# Patient Record
Sex: FEMALE | Race: WHITE | URBAN - METROPOLITAN AREA
[De-identification: names, ages, dates, MRNs, and addresses within clinical notes are randomized per-mention and may not be internally consistent; named-entity substitution may affect disease eponyms.]

---

## 2017-11-20 LAB — PAP SMEAR, EXTERNAL: NORMAL

## 2021-01-31 ENCOUNTER — HOSPITAL ENCOUNTER (OUTPATIENT)
Dept: LAB | Age: 51
Discharge: HOME | End: 2021-01-31

## 2021-01-31 LAB
ALBUMIN SERPL-MCNC: 2.8 G/DL (ref 3.5–5)
ALBUMIN/GLOB SERPL: 0.8 {RATIO} (ref 1.1–2.2)
ALP SERPL-CCNC: 100 U/L (ref 45–117)
ALT SERPL-CCNC: 28 U/L (ref 12–78)
ANION GAP SERPL CALC-SCNC: 7 MMOL/L (ref 5–15)
AST SERPL W P-5'-P-CCNC: 20 U/L (ref 15–37)
BASOPHILS # BLD: 0 K/UL (ref 0–0.1)
BASOPHILS NFR BLD: 0 % (ref 0–1)
BILIRUB SERPL-MCNC: 0.7 MG/DL (ref 0.2–1)
BUN SERPL-MCNC: 15 MG/DL (ref 6–20)
BUN/CREAT SERPL: 26 (ref 12–20)
CA-I BLD-MCNC: 8.8 MG/DL (ref 8.5–10.1)
CHLORIDE SERPL-SCNC: 105 MMOL/L (ref 97–108)
CO2 SERPL-SCNC: 27 MMOL/L (ref 21–32)
CREAT SERPL-MCNC: 0.58 MG/DL (ref 0.55–1.02)
DIFFERENTIAL METHOD BLD: ABNORMAL
EOSINOPHIL # BLD: 0.5 K/UL (ref 0–0.4)
EOSINOPHIL NFR BLD: 4 % (ref 0–7)
ERYTHROCYTE [DISTWIDTH] IN BLOOD BY AUTOMATED COUNT: 13.6 % (ref 11.5–14.5)
GLOBULIN SER CALC-MCNC: 3.5 G/DL (ref 2–4)
GLUCOSE SERPL-MCNC: 207 MG/DL (ref 65–100)
HCT VFR BLD AUTO: 43.9 % (ref 35–47)
HGB BLD-MCNC: 13.8 G/DL (ref 11.5–16)
IMM GRANULOCYTES # BLD AUTO: 0 K/UL (ref 0–0.04)
IMM GRANULOCYTES NFR BLD AUTO: 0 % (ref 0–0.5)
LYMPHOCYTES # BLD: 3.3 K/UL (ref 0.8–3.5)
LYMPHOCYTES NFR BLD: 27 % (ref 12–49)
MCH RBC QN AUTO: 28.7 PG (ref 26–34)
MCHC RBC AUTO-ENTMCNC: 31.4 G/DL (ref 30–36.5)
MCV RBC AUTO: 91.3 FL (ref 80–99)
MONOCYTES # BLD: 0.7 K/UL (ref 0–1)
MONOCYTES NFR BLD: 6 % (ref 5–13)
NEUTS SEG # BLD: 7.7 K/UL (ref 1.8–8)
NEUTS SEG NFR BLD: 63 % (ref 32–75)
PLATELET # BLD AUTO: 264 K/UL (ref 150–400)
PMV BLD AUTO: 11.5 FL (ref 8.9–12.9)
POTASSIUM SERPL-SCNC: 4.1 MMOL/L (ref 3.5–5.1)
PROT SERPL-MCNC: 6.3 G/DL (ref 6.4–8.2)
RBC # BLD AUTO: 4.81 M/UL (ref 3.8–5.2)
SODIUM SERPL-SCNC: 139 MMOL/L (ref 136–145)
WBC # BLD AUTO: 12.3 K/UL (ref 3.6–11)

## 2021-01-31 PROCEDURE — 84134 ASSAY OF PREALBUMIN: CPT

## 2021-01-31 PROCEDURE — 36415 COLL VENOUS BLD VENIPUNCTURE: CPT

## 2021-01-31 PROCEDURE — 80053 COMPREHEN METABOLIC PANEL: CPT

## 2021-01-31 PROCEDURE — 85025 COMPLETE CBC W/AUTO DIFF WBC: CPT

## 2021-01-31 PROCEDURE — 83036 HEMOGLOBIN GLYCOSYLATED A1C: CPT

## 2021-02-01 LAB
EST. AVERAGE GLUCOSE BLD GHB EST-MCNC: 235 MG/DL
HBA1C MFR BLD: 9.8 % (ref 4–5.6)
PREALB SERPL-MCNC: 17.3 MG/DL (ref 20–40)

## 2021-02-02 ENCOUNTER — HOSPITAL ENCOUNTER (OUTPATIENT)
Dept: LAB | Age: 51
Discharge: HOME | End: 2021-02-02

## 2021-02-02 LAB
BASOPHILS # BLD: 0 K/UL (ref 0–0.1)
BASOPHILS NFR BLD: 0 % (ref 0–1)
DIFFERENTIAL METHOD BLD: ABNORMAL
EOSINOPHIL # BLD: 0.3 K/UL (ref 0–0.4)
EOSINOPHIL NFR BLD: 3 % (ref 0–7)
ERYTHROCYTE [DISTWIDTH] IN BLOOD BY AUTOMATED COUNT: 13.2 % (ref 11.5–14.5)
HCT VFR BLD AUTO: 46.4 % (ref 35–47)
HGB BLD-MCNC: 14.8 G/DL (ref 11.5–16)
IMM GRANULOCYTES # BLD AUTO: 0 K/UL (ref 0–0.04)
IMM GRANULOCYTES NFR BLD AUTO: 0 % (ref 0–0.5)
LYMPHOCYTES # BLD: 3.6 K/UL (ref 0.8–3.5)
LYMPHOCYTES NFR BLD: 31 % (ref 12–49)
MCH RBC QN AUTO: 29.1 PG (ref 26–34)
MCHC RBC AUTO-ENTMCNC: 31.9 G/DL (ref 30–36.5)
MCV RBC AUTO: 91.2 FL (ref 80–99)
MONOCYTES # BLD: 0.7 K/UL (ref 0–1)
MONOCYTES NFR BLD: 6 % (ref 5–13)
NEUTS SEG # BLD: 7 K/UL (ref 1.8–8)
NEUTS SEG NFR BLD: 60 % (ref 32–75)
PLATELET # BLD AUTO: 270 K/UL (ref 150–400)
PMV BLD AUTO: 11.7 FL (ref 8.9–12.9)
RBC # BLD AUTO: 5.09 M/UL (ref 3.8–5.2)
WBC # BLD AUTO: 11.6 K/UL (ref 3.6–11)

## 2021-02-02 PROCEDURE — 36415 COLL VENOUS BLD VENIPUNCTURE: CPT

## 2021-02-02 PROCEDURE — 85025 COMPLETE CBC W/AUTO DIFF WBC: CPT

## 2021-06-01 LAB — MAMMOGRAPHY, EXTERNAL: NORMAL

## 2022-11-16 LAB
CREATININE, EXTERNAL: 0.62
HBA1C MFR BLD HPLC: 6.5 %
LDL-C, EXTERNAL: 49
TOTAL CHOLESTEROL, NCHOLT: 117

## 2022-11-17 VITALS — BODY MASS INDEX: 44.73 KG/M2 | HEIGHT: 64 IN | WEIGHT: 262 LBS

## 2022-11-17 RX ORDER — LOSARTAN POTASSIUM 50 MG/1
50 TABLET ORAL 2 TIMES DAILY
COMMUNITY
Start: 2022-10-02

## 2022-11-17 RX ORDER — LINACLOTIDE 145 UG/1
145 CAPSULE, GELATIN COATED ORAL DAILY
COMMUNITY
Start: 2022-09-24

## 2022-11-17 RX ORDER — FLASH GLUCOSE SENSOR
KIT MISCELLANEOUS
COMMUNITY
Start: 2022-09-18

## 2022-11-17 RX ORDER — METOPROLOL TARTRATE 50 MG/1
50 TABLET ORAL 2 TIMES DAILY
COMMUNITY
Start: 2022-08-19

## 2022-11-17 RX ORDER — METFORMIN HYDROCHLORIDE 500 MG/1
1000 TABLET ORAL 2 TIMES DAILY
COMMUNITY
Start: 2022-10-02

## 2022-11-17 RX ORDER — ATORVASTATIN CALCIUM 80 MG/1
80 TABLET, FILM COATED ORAL DAILY
COMMUNITY
Start: 2022-10-17

## 2022-11-17 RX ORDER — AMLODIPINE BESYLATE 2.5 MG/1
2.5 TABLET ORAL DAILY
COMMUNITY
Start: 2022-08-19 | End: 2022-11-29

## 2022-11-29 RX ORDER — AMLODIPINE BESYLATE 2.5 MG/1
TABLET ORAL
Qty: 90 TABLET | Refills: 0 | Status: SHIPPED | OUTPATIENT
Start: 2022-11-29

## 2022-11-30 PROBLEM — R79.89 ABNORMAL LFTS: Status: ACTIVE | Noted: 2022-11-30

## 2022-11-30 PROBLEM — E78.00 HYPERCHOLESTEROLEMIA: Status: ACTIVE | Noted: 2022-11-30

## 2022-11-30 PROBLEM — D72.829 LEUKOCYTOSIS: Status: ACTIVE | Noted: 2022-11-30

## 2022-11-30 PROBLEM — I63.9 STROKE (HCC): Status: ACTIVE | Noted: 2022-11-30

## 2022-11-30 PROBLEM — E66.9 OBESITY (BMI 30-39.9): Status: ACTIVE | Noted: 2022-11-30

## 2022-11-30 PROBLEM — I10 HYPERTENSION: Status: ACTIVE | Noted: 2022-11-30

## 2022-12-09 ENCOUNTER — OFFICE VISIT (OUTPATIENT)
Dept: INTERNAL MEDICINE CLINIC | Age: 52
End: 2022-12-09
Payer: COMMERCIAL

## 2022-12-09 VITALS
TEMPERATURE: 97.9 F | BODY MASS INDEX: 45.24 KG/M2 | HEIGHT: 64 IN | HEART RATE: 60 BPM | WEIGHT: 265 LBS | DIASTOLIC BLOOD PRESSURE: 80 MMHG | SYSTOLIC BLOOD PRESSURE: 130 MMHG

## 2022-12-09 DIAGNOSIS — E78.00 HYPERCHOLESTEROLEMIA: ICD-10-CM

## 2022-12-09 DIAGNOSIS — I10 PRIMARY HYPERTENSION: Primary | ICD-10-CM

## 2022-12-09 DIAGNOSIS — E66.01 MORBID OBESITY WITH BMI OF 45.0-49.9, ADULT (HCC): ICD-10-CM

## 2022-12-09 DIAGNOSIS — E11.69 TYPE 2 DIABETES MELLITUS WITH HYPERCHOLESTEROLEMIA (HCC): ICD-10-CM

## 2022-12-09 DIAGNOSIS — E83.42 HYPOMAGNESEMIA: ICD-10-CM

## 2022-12-09 DIAGNOSIS — E78.00 TYPE 2 DIABETES MELLITUS WITH HYPERCHOLESTEROLEMIA (HCC): ICD-10-CM

## 2022-12-09 PROBLEM — R53.83 FATIGUE: Status: ACTIVE | Noted: 2021-03-30

## 2022-12-09 PROBLEM — E11.9 DIABETES MELLITUS (HCC): Status: ACTIVE | Noted: 2021-01-27

## 2022-12-09 RX ORDER — LANOLIN ALCOHOL/MO/W.PET/CERES
400 CREAM (GRAM) TOPICAL DAILY
Qty: 90 TABLET | Refills: 1 | Status: SHIPPED | OUTPATIENT
Start: 2022-12-09

## 2022-12-09 RX ORDER — METFORMIN HYDROCHLORIDE 500 MG/1
1000 TABLET ORAL 2 TIMES DAILY
Qty: 360 TABLET | Refills: 1 | Status: SHIPPED | OUTPATIENT
Start: 2022-12-09

## 2022-12-09 RX ORDER — LANOLIN ALCOHOL/MO/W.PET/CERES
400 CREAM (GRAM) TOPICAL DAILY
COMMUNITY
Start: 2022-10-02 | End: 2022-12-09 | Stop reason: SDUPTHER

## 2022-12-09 RX ORDER — LOSARTAN POTASSIUM 50 MG/1
50 TABLET ORAL 2 TIMES DAILY
Qty: 90 TABLET | Refills: 1 | Status: SHIPPED | OUTPATIENT
Start: 2022-12-09

## 2022-12-09 NOTE — PROGRESS NOTES
[address] [facility] Internal Medicine  [address]  [place], [address] [facility]  Phone: [phone]      Patel Allen (: 1970) is a 46 y.o. female, established patient, here for evaluation of the following chief complaint(s):  Labs and Follow Up Chronic Condition         SUBJECTIVE/OBJECTIVE:  HPI:  Patient is here for follow up, she recently had blood work. Her glucose was 171 this morning, she denies any episodes of low blood sugar. She needs to  her [facility]. She had her annual eye exam for , she did not receive a prescription for new glasses. She needs a refill on Losartan, Metformin, and Magnesium. She has not had COVID or annual Flu vaccination. Patient stated she did not do mammogram this year and is planning to do in 2022. Prior to Admission medications    Medication Sig Start Date End Date Taking? Authorizing Provider   magnesium oxide (MAG-OX) 400 mg tablet Take 1 Tablet by mouth daily. 22  Yes Alexandrea Pope MD   losartan (COZAAR) 50 mg tablet Take 1 Tablet by mouth two (2) times a day. 22  Yes Alexandrea Pope MD   metFORMIN (GLUCOPHAGE) 500 mg tablet Take 2 Tablets by mouth two (2) times a day. 22  Yes Alexandrea Pope MD   amLODIPine (NORVASC) 2.5 mg tablet TAKE 1 TABLET BY MOUTH DAILY 22  Yes Alexandrea Pope MD   atorvastatin (LIPITOR) 80 mg tablet Take 80 mg by mouth daily. 10/17/22  Yes Provider, Historical   metoprolol tartrate (LOPRESSOR) 50 mg tablet Take 50 mg by mouth two (2) times a day. 22  Yes Provider, Historical   Linzess 145 mcg cap capsule Take 145 mcg by mouth daily. 22  Yes Provider, Historical   FreeStyle Aman 14 Day Sensor kit USE AS DIRECTED. EVERY 14 DAYS 22  Yes Provider, Historical        Allergies   Allergen Reactions    Penicillin G Hives     Reaction Type:  Allergy    Penicillins Rash        Past Medical History:   Diagnosis Date    Abnormal LFTs     Hypercholesterolemia Hypertension     Leukocytosis     Obesity (BMI 30-39. 9)     Stroke [facility])         Family History   Problem Relation Age of Onset    Stroke Mother     Cancer Mother 35        uterine    Hypertension Father     Cancer Father         skin    Breast Cancer Sister     Diabetes Paternal Grandmother         Past Surgical History:   Procedure Laterality Date    HX  SECTION   &        Review of Systems   Constitutional:  Negative for chills and fever. HENT:  Negative for congestion, ear pain, nosebleeds, sinus pain, sore throat and tinnitus. Eyes:  Negative for redness. Respiratory:  Negative for cough and shortness of breath. Cardiovascular:  Negative for chest pain and palpitations. Gastrointestinal:  Negative for abdominal pain, diarrhea, nausea and vomiting. Endocrine: Negative for cold intolerance and polyuria. Genitourinary:  Negative for dysuria and hematuria. Musculoskeletal:  Negative for back pain and neck pain. Skin:  Negative for rash. Neurological:  Negative for dizziness and headaches. Psychiatric/Behavioral: Negative. /80   Pulse 60   Temp 97.9 °F (36.6 °C) (Temporal)   Ht 5' 4\" (1.626 m)   Wt 265 lb (120.2 kg)   BMI 45.49 kg/m²      Physical Exam  Constitutional:       General: She is not in acute distress. Appearance: Normal appearance. HENT:      Head: Normocephalic and atraumatic. Right Ear: External ear normal.      Left Ear: External ear normal.      Nose: Nose normal.      Mouth/Throat:      Mouth: Mucous membranes are moist.   Eyes:      Extraocular Movements: Extraocular movements intact. Pupils: Pupils are equal, round, and reactive to light. Neck:      Comments: Neck is short  Cardiovascular:      Rate and Rhythm: Normal rate and regular rhythm. Pulmonary:      Effort: Pulmonary effort is normal.      Breath sounds: Normal breath sounds. Abdominal:      General: Bowel sounds are normal.      Palpations: Abdomen is soft. Tenderness: There is no abdominal tenderness. Musculoskeletal:         General: Normal range of motion. Cervical back: Neck supple. Skin:     General: Skin is warm and dry. Comments: Skin tags neck   Neurological:      General: No focal deficit present. Mental Status: She is alert and oriented to person, place, and time. Psychiatric:         Mood and Affect: Mood normal.       ASSESSMENT/PLAN:  Below is the assessment and plan developed based on review of pertinent history, physical exam, labs, studies, and medications. 1. Primary hypertension  Comments:  Blood pressure is controlled, advised to continue low-sodium diet and current medications  Orders:  -     losartan (COZAAR) 50 mg tablet; Take 1 Tablet by mouth two (2) times a day., Normal, Disp-90 Tablet, R-1  2. Hypercholesterolemia  Comments:  Cholesterol is well controlled with  total cholesterol of 117, triglyceride 95, HDL 50, LDL 49. Advised to continue low-fat diet and atorvastatin 6 days a wee  Orders:  -     LIPID PANEL; Future  -     TSH 3RD GENERATION; Future  3. Morbid obesity with BMI of 45.0-49.9, adult ([facility])  Comments:  Advised healthy diet, to avoid soft drinks including diet soft drinks juice and juices, daily exercise and weight loss. .  Advised to check with insurance whether Kirkwood Bejarano will be covered  4. Type 2 diabetes mellitus with hypercholesterolemia ([facility])  Comments:  Diabetes is controlled with a glucose of 116 and A1c 6.5 BUN 19, creatinine 0.62. Advised to continue diabetic diet and metformin  Orders:  -     HEMOGLOBIN A1C WITH EAG; Future  -     MICROALBUMIN, UR, RAND W/ MICROALB/CREAT RATIO; Future  -     METABOLIC PANEL, COMPREHENSIVE; Future  -     metFORMIN (GLUCOPHAGE) 500 mg tablet; Take 2 Tablets by mouth two (2) times a day., Normal, Disp-360 Tablet, R-1  5. Hypomagnesemia  Comments:   To continue magnesium, will check magnesium level  Orders:  -     magnesium oxide (MAG-OX) 400 mg tablet; Take 1 Tablet by mouth daily. , Normal, Disp-90 Tablet, R-1  -     MAGNESIUM; Future    Return if symptoms worsen or fail to improve. There are no Patient Instructions on file for this visit. Health Maintenance Due   Topic Date Due    Hepatitis C Screening  Never done    Depression Screen  Never done    COVID-19 Vaccine (1) Never done    Foot Exam Q1  Never done    MICROALBUMIN Q1  Never done    Eye Exam Retinal or Dilated  Never done    DTaP/Tdap/Td series (1 - Tdap) Never done    Shingrix Vaccine Age 50> (1 of 2) Never done    Cervical cancer screen  11/20/2020    Breast Cancer Screen Mammogram  06/01/2022    Flu Vaccine (1) Never done        Aspects of this note may have been generated using voice recognition software. Despite editing, there may be unrecognized errors. An electronic signature was used to authenticate this note.   -- Kandice Caraballo MD

## 2022-12-09 NOTE — PROGRESS NOTES
Chief Complaint   Patient presents with    Labs    Follow Up Chronic Condition     1. Have you been to the ER, urgent care clinic since your last visit? Hospitalized since your last visit? No    2. Have you seen or consulted any other health care providers outside of the [address] [place] [address] since your last visit? Include any pap smears or colon screening.   Colonoscopy Dr. Talia Contreras Imiquimod Counseling:  I discussed with the patient the risks of imiquimod including but not limited to erythema, scaling, itching, weeping, crusting, and pain.  Patient understands that the inflammatory response to imiquimod is variable from person to person and was educated regarded proper titration schedule.  If flu-like symptoms develop, patient knows to discontinue the medication and contact us.

## 2022-12-30 DIAGNOSIS — I10 PRIMARY HYPERTENSION: ICD-10-CM

## 2022-12-30 RX ORDER — LOSARTAN POTASSIUM 50 MG/1
TABLET ORAL
Qty: 90 TABLET | Refills: 1 | Status: SHIPPED | OUTPATIENT
Start: 2022-12-30

## 2023-01-04 RX ORDER — BLOOD SUGAR DIAGNOSTIC
STRIP MISCELLANEOUS
Qty: 200 STRIP | Refills: 1 | Status: SHIPPED | OUTPATIENT
Start: 2023-01-04

## 2023-01-12 ENCOUNTER — TELEPHONE (OUTPATIENT)
Dept: INTERNAL MEDICINE CLINIC | Age: 53
End: 2023-01-12

## 2023-01-12 NOTE — TELEPHONE ENCOUNTER
Patient states insurance will cover Natividad Mayer.   [facility]

## 2023-01-17 RX ORDER — TIRZEPATIDE 2.5 MG/.5ML
2.5 INJECTION, SOLUTION SUBCUTANEOUS
Qty: 4 PEN | Refills: 0 | Status: SHIPPED | OUTPATIENT
Start: 2023-01-17 | End: 2023-01-21 | Stop reason: ALTCHOICE

## 2023-02-28 RX ORDER — FLASH GLUCOSE SENSOR
KIT MISCELLANEOUS
Qty: 1 KIT | Refills: 4 | Status: SHIPPED | OUTPATIENT
Start: 2023-02-28

## 2023-03-06 RX ORDER — AMLODIPINE BESYLATE 2.5 MG/1
2.5 TABLET ORAL DAILY
Qty: 90 TABLET | Refills: 1 | Status: SHIPPED | OUTPATIENT
Start: 2023-03-06

## 2023-03-06 NOTE — TELEPHONE ENCOUNTER
Requested Prescriptions     Pending Prescriptions Disp Refills    amLODIPine (NORVASC) 2.5 mg tablet 90 Tablet 0     Sig: Take 1 Tablet by mouth daily.

## 2023-04-29 RX ORDER — ATORVASTATIN CALCIUM 80 MG/1
TABLET, FILM COATED ORAL
Qty: 90 TABLET | Refills: 0 | Status: SHIPPED | OUTPATIENT
Start: 2023-04-29

## 2023-05-16 RX ORDER — ATORVASTATIN CALCIUM 80 MG/1
1 TABLET, FILM COATED ORAL DAILY
COMMUNITY
Start: 2023-04-29 | End: 2023-06-15 | Stop reason: SDUPTHER

## 2023-05-16 RX ORDER — METOPROLOL TARTRATE 50 MG/1
50 TABLET, FILM COATED ORAL 2 TIMES DAILY
COMMUNITY
Start: 2022-08-19 | End: 2023-06-15 | Stop reason: SDUPTHER

## 2023-05-16 RX ORDER — LOSARTAN POTASSIUM 50 MG/1
1 TABLET ORAL 2 TIMES DAILY
COMMUNITY
Start: 2022-12-30 | End: 2023-06-15 | Stop reason: SDUPTHER

## 2023-05-16 RX ORDER — AMLODIPINE BESYLATE 2.5 MG/1
2.5 TABLET ORAL DAILY
COMMUNITY
Start: 2023-03-06 | End: 2023-06-15 | Stop reason: SDUPTHER

## 2023-05-16 RX ORDER — MAGNESIUM OXIDE 400 MG/1
400 TABLET ORAL DAILY
COMMUNITY
Start: 2022-12-09

## 2023-05-16 RX ORDER — SEMAGLUTIDE 1.34 MG/ML
INJECTION, SOLUTION SUBCUTANEOUS
COMMUNITY
Start: 2023-04-09 | End: 2023-06-15 | Stop reason: SDUPTHER

## 2023-05-25 LAB
ALBUMIN SERPL-MCNC: 3.9 G/DL (ref 3.8–4.9)
ALBUMIN/CREAT UR: <11 MG/G CREAT (ref 0–29)
ALBUMIN/GLOB SERPL: 1.4 {RATIO} (ref 1.2–2.2)
ALP SERPL-CCNC: 103 IU/L (ref 44–121)
ALT SERPL-CCNC: 26 IU/L (ref 0–32)
AST SERPL-CCNC: 17 IU/L (ref 0–40)
BILIRUB SERPL-MCNC: 0.4 MG/DL (ref 0–1.2)
BUN SERPL-MCNC: 11 MG/DL (ref 6–24)
BUN/CREAT SERPL: 16 (ref 9–23)
CALCIUM SERPL-MCNC: 9.7 MG/DL (ref 8.7–10.2)
CHLORIDE SERPL-SCNC: 101 MMOL/L (ref 96–106)
CHOLEST SERPL-MCNC: 129 MG/DL (ref 100–199)
CO2 SERPL-SCNC: 25 MMOL/L (ref 20–29)
CREAT SERPL-MCNC: 0.67 MG/DL (ref 0.57–1)
CREAT UR-MCNC: 26.3 MG/DL
EGFRCR SERPLBLD CKD-EPI 2021: 105 ML/MIN/1.73
EST. AVERAGE GLUCOSE BLD GHB EST-MCNC: 137 MG/DL
GLOBULIN SER CALC-MCNC: 2.7 G/DL (ref 1.5–4.5)
GLUCOSE SERPL-MCNC: 117 MG/DL (ref 70–99)
HBA1C MFR BLD: 6.4 % (ref 4.8–5.6)
HDLC SERPL-MCNC: 55 MG/DL
LDLC SERPL CALC-MCNC: 56 MG/DL (ref 0–99)
MAGNESIUM SERPL-MCNC: 1.5 MG/DL (ref 1.6–2.3)
MICROALBUMIN UR-MCNC: <3 UG/ML
POTASSIUM SERPL-SCNC: 4.9 MMOL/L (ref 3.5–5.2)
PROT SERPL-MCNC: 6.6 G/DL (ref 6–8.5)
SODIUM SERPL-SCNC: 139 MMOL/L (ref 134–144)
TRIGL SERPL-MCNC: 95 MG/DL (ref 0–149)
TSH SERPL DL<=0.005 MIU/L-ACNC: 1.74 UIU/ML (ref 0.45–4.5)
VLDLC SERPL CALC-MCNC: 18 MG/DL (ref 5–40)

## 2023-06-15 DIAGNOSIS — E78.00 HYPERCHOLESTEROLEMIA: ICD-10-CM

## 2023-06-15 DIAGNOSIS — Z11.59 NEED FOR HEPATITIS C SCREENING TEST: ICD-10-CM

## 2023-06-15 DIAGNOSIS — E11.9 TYPE 2 DIABETES MELLITUS WITHOUT COMPLICATION, WITHOUT LONG-TERM CURRENT USE OF INSULIN (HCC): ICD-10-CM

## 2023-06-15 DIAGNOSIS — E83.42 HYPOMAGNESEMIA: ICD-10-CM

## 2023-07-03 RX ORDER — FLASH GLUCOSE SENSOR
KIT MISCELLANEOUS
Qty: 1 EACH | Refills: 1 | Status: SHIPPED | OUTPATIENT
Start: 2023-07-03

## 2023-09-06 ENCOUNTER — TELEPHONE (OUTPATIENT)
Facility: CLINIC | Age: 53
End: 2023-09-06

## 2023-09-06 RX ORDER — AMLODIPINE BESYLATE 5 MG/1
5 TABLET ORAL DAILY
Qty: 90 TABLET | Refills: 1 | Status: SHIPPED | OUTPATIENT
Start: 2023-09-06

## 2023-09-06 NOTE — TELEPHONE ENCOUNTER
Patient called. Her insurance will not cover Amlodipine 2.5 twice daily. They will only cover 5mg. Patient wants it sent in as 5mg so she doesn't have to pay out of pocket.  Patient stated she has been monitoring her blood pressures at home and there has been very few times where she only needed 2.5mg

## 2023-09-10 RX ORDER — FLASH GLUCOSE SENSOR
KIT MISCELLANEOUS
Qty: 1 EACH | Refills: 1 | Status: SHIPPED | OUTPATIENT
Start: 2023-09-10

## 2023-11-18 RX ORDER — LOSARTAN POTASSIUM 50 MG/1
50 TABLET ORAL 2 TIMES DAILY
Qty: 180 TABLET | Refills: 1 | Status: SHIPPED | OUTPATIENT
Start: 2023-11-18

## 2023-12-14 RX ORDER — SEMAGLUTIDE 0.68 MG/ML
INJECTION, SOLUTION SUBCUTANEOUS
Qty: 3 ML | Refills: 1 | Status: SHIPPED | OUTPATIENT
Start: 2023-12-14

## 2024-01-30 ENCOUNTER — OFFICE VISIT (OUTPATIENT)
Facility: CLINIC | Age: 54
End: 2024-01-30
Payer: COMMERCIAL

## 2024-01-30 VITALS
DIASTOLIC BLOOD PRESSURE: 88 MMHG | WEIGHT: 282 LBS | OXYGEN SATURATION: 99 % | TEMPERATURE: 97.4 F | HEART RATE: 63 BPM | SYSTOLIC BLOOD PRESSURE: 126 MMHG | BODY MASS INDEX: 48.14 KG/M2 | HEIGHT: 64 IN

## 2024-01-30 DIAGNOSIS — M25.471 ANKLE EDEMA, BILATERAL: ICD-10-CM

## 2024-01-30 DIAGNOSIS — I10 ESSENTIAL HYPERTENSION, BENIGN: Primary | ICD-10-CM

## 2024-01-30 DIAGNOSIS — E83.42 HYPOMAGNESEMIA: ICD-10-CM

## 2024-01-30 DIAGNOSIS — I10 ESSENTIAL HYPERTENSION, BENIGN: ICD-10-CM

## 2024-01-30 DIAGNOSIS — E11.9 TYPE 2 DIABETES MELLITUS WITHOUT COMPLICATION, WITHOUT LONG-TERM CURRENT USE OF INSULIN (HCC): ICD-10-CM

## 2024-01-30 DIAGNOSIS — D72.10 EOSINOPHILIA, UNSPECIFIED TYPE: ICD-10-CM

## 2024-01-30 DIAGNOSIS — Z12.31 BREAST CANCER SCREENING BY MAMMOGRAM: ICD-10-CM

## 2024-01-30 DIAGNOSIS — E78.00 HYPERCHOLESTEROLEMIA: ICD-10-CM

## 2024-01-30 DIAGNOSIS — M25.472 ANKLE EDEMA, BILATERAL: ICD-10-CM

## 2024-01-30 PROBLEM — I63.9: Status: ACTIVE | Noted: 2022-11-30

## 2024-01-30 PROCEDURE — 3074F SYST BP LT 130 MM HG: CPT | Performed by: INTERNAL MEDICINE

## 2024-01-30 PROCEDURE — 3079F DIAST BP 80-89 MM HG: CPT | Performed by: INTERNAL MEDICINE

## 2024-01-30 PROCEDURE — 99214 OFFICE O/P EST MOD 30 MIN: CPT | Performed by: INTERNAL MEDICINE

## 2024-01-30 RX ORDER — AMLODIPINE BESYLATE 5 MG/1
5 TABLET ORAL DAILY
Qty: 90 TABLET | Refills: 1 | Status: SHIPPED | OUTPATIENT
Start: 2024-01-30

## 2024-01-30 RX ORDER — METOPROLOL TARTRATE 50 MG/1
50 TABLET, FILM COATED ORAL 2 TIMES DAILY
Qty: 180 TABLET | Refills: 1 | Status: SHIPPED | OUTPATIENT
Start: 2024-01-30

## 2024-01-30 RX ORDER — ATORVASTATIN CALCIUM 80 MG/1
80 TABLET, FILM COATED ORAL DAILY
Qty: 90 TABLET | Refills: 1 | Status: SHIPPED | OUTPATIENT
Start: 2024-01-30

## 2024-01-30 ASSESSMENT — PATIENT HEALTH QUESTIONNAIRE - PHQ9
SUM OF ALL RESPONSES TO PHQ QUESTIONS 1-9: 0
SUM OF ALL RESPONSES TO PHQ9 QUESTIONS 1 & 2: 0
2. FEELING DOWN, DEPRESSED OR HOPELESS: 0
1. LITTLE INTEREST OR PLEASURE IN DOING THINGS: 0

## 2024-01-30 NOTE — PROGRESS NOTES
Chief Complaint   Patient presents with    Discuss Labs    Follow-up Chronic Condition         1. \"Have you been to the ER, urgent care clinic since your last visit?  Hospitalized since your last visit?\" No    2. \"Have you seen or consulted any other health care providers outside of the [facility] since your last visit?\" No     3. For patients aged 45-75: Has the patient had a colonoscopy / FIT/ Cologuard? Yes - Care Gap present. Most recent result on file      If the patient is female:    4. For patients aged 40-74: Has the patient had a mammogram within the past 2 years? Yes - Care Gap present. Most recent result on file      5. For patients aged 21-65: Has the patient had a pap smear? No

## 2024-01-30 NOTE — PROGRESS NOTES
[facility]  [address] [place], [place] [address]  Phone: [phone]      Poornima Ulloa (: 1970) is a 53 y.o. female, established patient, here for evaluation of the following chief complaint(s):    SUBJECTIVE/OBJECTIVE:  HPI:  Patient is here for follow up, she recently had blood work. She checks her blood sugar 1 time daily; some of her ranges are . She denies any episodes of low blood sugar and her annual eye exam is due in March. She has been unable to get her CGM and test strips. Her blood pressure at home runs around; 110//62. She has not been in the ER or the hospital since her last visit.   She has some hand pain in her joints and her left thumb locks up and she has to use her other hand to unlock it. Didn't  do mammogram in ,usually does with [facility]. Prefers  not to get vaccinations.Had eye exam in 3/2023 with Dr Don .  Patient stated she is taking care of her mom get time to do her mammogram.  Patient stated she is unable to get the Ozempic as it is out of stock.  She stated her  is on Jardiance and it is helping him to lose weight.  He is taking magnesium 3 tablets daily    Hemoglobin A1C   Date Value Ref Range Status   11/15/2023 6.7 (H) 4.8 - 5.6 % Final     Comment:              Prediabetes: 5.7 - 6.4           Diabetes: >6.4           Glycemic control for adults with diabetes: <7.0        Hemoglobin   Date Value Ref Range Status   11/15/2023 12.4 11.1 - 15.9 g/dL Final     Hematocrit   Date Value Ref Range Status   11/15/2023 38.9 34.0 - 46.6 % Final     Creatinine   Date Value Ref Range Status   11/15/2023 0.58 0.57 - 1.00 mg/dL Final     Glucose   Date Value Ref Range Status   11/15/2023 114 (H) 70 - 99 mg/dL Final     TSH   Date Value Ref Range Status   2023 1.740 0.450 - 4.500 uIU/mL Final     Potassium   Date Value Ref Range Status   11/15/2023 4.5 3.5 - 5.2 mmol/L Final     Cholesterol   Date Value Ref

## 2024-01-31 RX ORDER — FLASH GLUCOSE SENSOR
KIT MISCELLANEOUS
Qty: 1 EACH | Refills: 1 | Status: SHIPPED | OUTPATIENT
Start: 2024-01-31

## 2024-02-28 ENCOUNTER — OFFICE VISIT (OUTPATIENT)
Age: 54
End: 2024-02-28
Payer: COMMERCIAL

## 2024-02-28 VITALS — HEART RATE: 75 BPM | SYSTOLIC BLOOD PRESSURE: 134 MMHG | DIASTOLIC BLOOD PRESSURE: 82 MMHG | OXYGEN SATURATION: 97 %

## 2024-02-28 DIAGNOSIS — I63.9: Primary | ICD-10-CM

## 2024-02-28 DIAGNOSIS — H53.9 VISUAL CHANGES: ICD-10-CM

## 2024-02-28 DIAGNOSIS — E11.42 TYPE 2 DIABETES MELLITUS WITH DIABETIC POLYNEUROPATHY, WITHOUT LONG-TERM CURRENT USE OF INSULIN (HCC): ICD-10-CM

## 2024-02-28 PROCEDURE — 3075F SYST BP GE 130 - 139MM HG: CPT | Performed by: NURSE PRACTITIONER

## 2024-02-28 PROCEDURE — 99245 OFF/OP CONSLTJ NEW/EST HI 55: CPT | Performed by: NURSE PRACTITIONER

## 2024-02-28 PROCEDURE — 3079F DIAST BP 80-89 MM HG: CPT | Performed by: NURSE PRACTITIONER

## 2024-02-28 RX ORDER — ASPIRIN 81 MG/1
81 TABLET, CHEWABLE ORAL DAILY
COMMUNITY

## 2024-02-28 NOTE — PROGRESS NOTES
Poornima Ulloa is a 53 y.o. female who presents with the following  Chief Complaint   Patient presents with    New Patient     TIA, late Jan 2021       HPI    Patient comes in to establish as a new patient for stroke in 2021  She had a left MCA stroke  The imaging and information is all on epic via [facility]  She is working at her A1c  She does note that she needs to increase activity level and try to lose weight  She is on Ozempic  Her A1c is a little bit high  Her blood pressure is great  Her LDL is at goal with Lipitor below 70    She does notice that when she is writing and is more tired and has written more her actual penmanship gets worse  She also notices that sometimes she will have some expressive aphasia or carpal her speech when she is talking too fast or has been talking a lot  This does not happen but maybe once or twice a week  Her  does notice it but it is not really a big issue  She has been through physical therapy and Occupational Therapy and speech therapy in the past  No falls  No memory loss or confusion  She does get a good solid 6 hours of sleep at night  She does work and drives a lot in [facility]  She has not had any recent imaging  She is following with ophthalmology and was told there is a little bit of leaking blood noticeable back behind her eyes  [facility] which showed peripheral neuropathy  Mostly what sounds to be a meralgia paresthetica on the right thigh    Allergies   Allergen Reactions    Penicillins Hives and Rash     Reaction Type: Allergy         Current Outpatient Medications   Medication Sig Dispense Refill    aspirin 81 MG chewable tablet Take 1 tablet by mouth daily      Continuous Blood Gluc Sensor (SporterpilotSTYLE DIANNA 14 DAY SENSOR) MISC USE TO CHECK BLOOD SUGAR AS DIRECTED AND CHANGE EVERY 14 DAYS 1 each 1    amLODIPine (NORVASC) 5 MG tablet Take 1 tablet by mouth daily 90 tablet 1    metoprolol tartrate (LOPRESSOR) 50 MG tablet Take 1 tablet by

## 2024-02-28 NOTE — PROGRESS NOTES
Was seeing neuro since 2021, Dr. Guallpa  Has done OT/PT/Speech  Currently has been doing ok, no residual symptoms

## 2024-04-16 RX ORDER — FELODIPINE 5 MG/1
5 TABLET, EXTENDED RELEASE ORAL DAILY
COMMUNITY
End: 2024-04-16 | Stop reason: SDUPTHER

## 2024-04-16 NOTE — TELEPHONE ENCOUNTER
Rx for Amlodipine is not covered by patient's plan. The preferred alternative is: Felodipine. Please change.MAGDA TAYLOR MA

## 2024-04-17 RX ORDER — FELODIPINE 5 MG/1
5 TABLET, EXTENDED RELEASE ORAL DAILY
Qty: 30 TABLET | Refills: 1 | Status: SHIPPED | OUTPATIENT
Start: 2024-04-17

## 2024-04-18 RX ORDER — FELODIPINE 5 MG/1
5 TABLET, EXTENDED RELEASE ORAL DAILY
Qty: 90 TABLET | OUTPATIENT
Start: 2024-04-18

## 2024-05-18 DIAGNOSIS — I10 ESSENTIAL HYPERTENSION, BENIGN: ICD-10-CM

## 2024-05-18 DIAGNOSIS — E78.00 HYPERCHOLESTEROLEMIA: ICD-10-CM

## 2024-05-19 RX ORDER — METOPROLOL TARTRATE 50 MG/1
50 TABLET, FILM COATED ORAL 2 TIMES DAILY
Qty: 180 TABLET | Refills: 0 | Status: SHIPPED | OUTPATIENT
Start: 2024-05-19

## 2024-05-19 RX ORDER — ATORVASTATIN CALCIUM 80 MG/1
80 TABLET, FILM COATED ORAL DAILY
Qty: 90 TABLET | Refills: 0 | Status: SHIPPED | OUTPATIENT
Start: 2024-05-19

## 2024-05-19 RX ORDER — LOSARTAN POTASSIUM 50 MG/1
50 TABLET ORAL 2 TIMES DAILY
Qty: 180 TABLET | Refills: 0 | Status: SHIPPED | OUTPATIENT
Start: 2024-05-19

## 2024-06-12 ENCOUNTER — TELEPHONE (OUTPATIENT)
Facility: CLINIC | Age: 54
End: 2024-06-12

## 2024-06-12 NOTE — TELEPHONE ENCOUNTER
Contacted patient regarding scheduling of mammogram. Patient reports she has her mammogram done at [facility].

## 2024-06-16 RX ORDER — FELODIPINE 5 MG/1
5 TABLET, EXTENDED RELEASE ORAL DAILY
Qty: 30 TABLET | Refills: 1 | Status: SHIPPED | OUTPATIENT
Start: 2024-06-16

## 2024-06-17 RX ORDER — FELODIPINE 5 MG/1
5 TABLET, EXTENDED RELEASE ORAL DAILY
Qty: 90 TABLET | OUTPATIENT
Start: 2024-06-17

## 2024-06-17 RX ORDER — FLASH GLUCOSE SENSOR
KIT MISCELLANEOUS
Qty: 1 EACH | Refills: 1 | Status: SHIPPED | OUTPATIENT
Start: 2024-06-17

## 2024-07-07 RX ORDER — LINACLOTIDE 145 UG/1
145 CAPSULE, GELATIN COATED ORAL DAILY
Qty: 90 CAPSULE | Refills: 1 | Status: SHIPPED | OUTPATIENT
Start: 2024-07-07

## 2024-07-26 RX ORDER — EMPAGLIFLOZIN 10 MG/1
10 TABLET, FILM COATED ORAL DAILY
Qty: 90 TABLET | Refills: 1 | Status: SHIPPED | OUTPATIENT
Start: 2024-07-26

## 2024-08-17 RX ORDER — LOSARTAN POTASSIUM 50 MG/1
50 TABLET ORAL 2 TIMES DAILY
Qty: 180 TABLET | Refills: 0 | Status: SHIPPED | OUTPATIENT
Start: 2024-08-17

## 2024-08-21 ENCOUNTER — OFFICE VISIT (OUTPATIENT)
Age: 54
End: 2024-08-21
Payer: COMMERCIAL

## 2024-08-21 VITALS
RESPIRATION RATE: 16 BRPM | TEMPERATURE: 97.2 F | HEART RATE: 72 BPM | OXYGEN SATURATION: 99 % | DIASTOLIC BLOOD PRESSURE: 76 MMHG | SYSTOLIC BLOOD PRESSURE: 126 MMHG

## 2024-08-21 DIAGNOSIS — I63.9: Primary | ICD-10-CM

## 2024-08-21 DIAGNOSIS — E11.42 TYPE 2 DIABETES MELLITUS WITH DIABETIC POLYNEUROPATHY, WITHOUT LONG-TERM CURRENT USE OF INSULIN (HCC): ICD-10-CM

## 2024-08-21 PROCEDURE — 99214 OFFICE O/P EST MOD 30 MIN: CPT | Performed by: NURSE PRACTITIONER

## 2024-08-21 PROCEDURE — 3074F SYST BP LT 130 MM HG: CPT | Performed by: NURSE PRACTITIONER

## 2024-08-21 PROCEDURE — 3078F DIAST BP <80 MM HG: CPT | Performed by: NURSE PRACTITIONER

## 2024-08-21 RX ORDER — FLUVOXAMINE MALEATE 50 MG/1
50 TABLET, COATED ORAL NIGHTLY
Qty: 30 TABLET | Refills: 3 | Status: SHIPPED | OUTPATIENT
Start: 2024-08-21

## 2024-08-21 NOTE — PROGRESS NOTES
identified.      Electronically Signed by Dewey Irizarry MD 2/16/2020 4:43 AM      EEG Result:      Carotid Doppler:        Recent Labs:  Lab Results   Component Value Date    WBC 11.1 (H) 11/15/2023    HGB 12.4 11/15/2023    HCT 38.9 11/15/2023    MCV 84 11/15/2023     11/15/2023     Lab Results   Component Value Date     11/15/2023    K 4.5 11/15/2023     11/15/2023    CO2 27 11/15/2023    BUN 14 11/15/2023    CREATININE 0.58 11/15/2023    GLUCOSE 114 (H) 11/15/2023    CALCIUM 9.2 11/15/2023    LABALBU <3.0 11/15/2023    BILITOT 0.4 11/15/2023    ALKPHOS 90 11/15/2023    AST 16 11/15/2023    ALT 19 11/15/2023    LABGLOM 108 11/15/2023    GFRAA >60 01/31/2021    AGRATIO 1.3 11/15/2023    GLOB 3.5 01/31/2021       Lab Results   Component Value Date    CHOL 117 11/15/2023     Lab Results   Component Value Date    TRIG 81 11/15/2023     Lab Results   Component Value Date    HDL 56 11/15/2023     No components found for: \"LDLCHOLESTEROL\", \"LDLCALC\"  Lab Results   Component Value Date    VLDL 16 11/15/2023     No results found for: \"CHOLHDLRATIO\"    No results found for: \"SEDRATE\"    Hemoglobin A1C   Date Value Ref Range Status   11/15/2023 6.7 (H) 4.8 - 5.6 % Final     Comment:              Prediabetes: 5.7 - 6.4           Diabetes: >6.4           Glycemic control for adults with diabetes: <7.0       No results found for: \"ADAN\"             1. Nonatherosclerotic cerebrovascular accident (CVA) (HCC)  2. Type 2 diabetes mellitus with diabetic polyneuropathy, without long-term current use of insulin (HCC)  3. Type 2 diabetes mellitus with hyperglycemia (HCC)  -     [facility]     Will get the MRI sent to [facility] for a reevaluation poststroke  We want to make sure nothing new has come up or change    Continue for stroke precautions and preventatives    Refer to Diabetic health         This note was created using voice recognition software. Despite editing,

## 2024-08-30 ENCOUNTER — OFFICE VISIT (OUTPATIENT)
Facility: CLINIC | Age: 54
End: 2024-08-30

## 2024-08-30 VITALS
BODY MASS INDEX: 47.12 KG/M2 | HEART RATE: 56 BPM | OXYGEN SATURATION: 98 % | DIASTOLIC BLOOD PRESSURE: 72 MMHG | HEIGHT: 64 IN | WEIGHT: 276 LBS | TEMPERATURE: 98 F | SYSTOLIC BLOOD PRESSURE: 139 MMHG

## 2024-08-30 DIAGNOSIS — Z12.31 BREAST CANCER SCREENING BY MAMMOGRAM: Primary | ICD-10-CM

## 2024-08-30 DIAGNOSIS — E78.00 HYPERCHOLESTEROLEMIA: ICD-10-CM

## 2024-08-30 DIAGNOSIS — I10 ESSENTIAL HYPERTENSION, BENIGN: ICD-10-CM

## 2024-08-30 DIAGNOSIS — E11.9 TYPE 2 DIABETES MELLITUS WITHOUT COMPLICATION, WITHOUT LONG-TERM CURRENT USE OF INSULIN (HCC): ICD-10-CM

## 2024-08-30 RX ORDER — ZOSTER VACCINE RECOMBINANT, ADJUVANTED 50 MCG/0.5
0.5 KIT INTRAMUSCULAR SEE ADMIN INSTRUCTIONS
Qty: 0.5 ML | Refills: 0 | Status: SHIPPED | OUTPATIENT
Start: 2024-08-30 | End: 2025-02-26

## 2024-08-30 SDOH — ECONOMIC STABILITY: FOOD INSECURITY: WITHIN THE PAST 12 MONTHS, THE FOOD YOU BOUGHT JUST DIDN'T LAST AND YOU DIDN'T HAVE MONEY TO GET MORE.: NEVER TRUE

## 2024-08-30 SDOH — ECONOMIC STABILITY: FOOD INSECURITY: WITHIN THE PAST 12 MONTHS, YOU WORRIED THAT YOUR FOOD WOULD RUN OUT BEFORE YOU GOT MONEY TO BUY MORE.: NEVER TRUE

## 2024-08-30 SDOH — ECONOMIC STABILITY: INCOME INSECURITY: HOW HARD IS IT FOR YOU TO PAY FOR THE VERY BASICS LIKE FOOD, HOUSING, MEDICAL CARE, AND HEATING?: NOT HARD AT ALL

## 2024-08-30 ASSESSMENT — ENCOUNTER SYMPTOMS
NAUSEA: 0
ABDOMINAL PAIN: 0
DIARRHEA: 0
COUGH: 0
VOMITING: 0
SHORTNESS OF BREATH: 0

## 2024-08-30 NOTE — PROGRESS NOTES
[facility]  [address] [place], [place] [address]  Phone: [phone]      Poornima Ulloa (: 1970) is a 54 y.o. female, established patient, here for evaluation of the following chief complaint(s):  Follow-up Chronic Condition, Hypertension, Diabetes, and Discuss Medications (Would like Rx for Ozempic)       SUBJECTIVE/OBJECTIVE:  History of Present Illness  The patient is a 54-year-old female with a past medical history of hypertension, hypercholesterolemia, type 2 diabetes, and obesity, seen today for a follow-up of her diabetes and to discuss medications.    She reports that her blood sugar levels have been slightly elevated over the past month, even though she avoids eating after dinner. Her morning readings typically range from 130 to 138. She attributes this to poor sleep and work-related stress. Her current medication regimen includes Jardiance 10 mg and metformin 500 mg twice daily. She has been unable to obtain Ozempic since mid-2024 and notes that Ozempic did not aid in weight loss . She has not undergone any recent blood tests and requires a new order. She has an upcoming appointment with a diabetes educator on 2024 and plans to complete her blood work prior to this visit.    She monitors her blood pressure at home, which occasionally drops to 110 or 105. She has discontinued amlodipine and started felodipine. She also takes metoprolol 50 mg twice daily.    She has not had a mammogram in over 3 years due to caregiving responsibilities for her father and . She attempted to schedule an appointment with [facility] but was unsuccessful due to their fully booked schedule. She now plans to try the [place] office.    She has not received the shingles vaccine and has never had chickenpox. She does not recall when she last had a tetanus vaccine, but it was likely more than 10 years ago.    She had an eye checkup in  2 Diabetes Mellitus.  Blood sugar levels have been slightly elevated, with readings between 130-138 mg/dL over the past month.  A1c was 6.7 in November 23 she is currently taking Jardiance 10 mg daily and Metformin 500 mg twice a day. She has not taken Ozempic since mid-March. Advised to verify with her insurance company regarding coverage for Ozempic, Wegovy, and Mounjaro. Lab orders for CBC, CMP, thyroid, and urine tests will be given. She has an appointment with a diabetes educator on September 17, 2024.     2. Hypertension.  She is currently taking Losartan, Felodipine, and Metoprolol 50 mg twice a day. Blood pressure readings at home are generally low, sometimes as low as 110/70 mmHg. Blood pressure today was 139/72 mmHg. She is advised to continue her current medication regimen, low-sodium diet and monitor blood pressure at home.    3. Hypercholesterolemia.  She is currently taking Atorvastatin 80 mg. Her LDL was 45 mg/dL in November of last year. She is advised to continue her current medication regimen, low-fat diet and will recheck.    4. Obesity.  She has not been able to lose weight despite previous attempts. Weight loss medications such as Ozempic, Wegovy, and Mounjaro were discussed. She is advised to verify with her insurance company regarding coverage for these medications. If covered, one of these medications will be prescribed after blood test to assist with weight loss and diabetes management.    5. Health Maintenance.  A mammogram order will be provided. She is advised to receive the shingles vaccine at the pharmacy, with the second dose to be administered 3 to 6 months later. A tetanus vaccine will also be ordered. She had a colonoscopy in 2022 and an eye checkup in May 2024, which showed mild nonproliferative diabetic retinopathy in both eyes.    Follow-up  A follow-up visit is scheduled in 6 weeks.    1. Breast cancer screening by mammogram  -     ZACHARY SUSANNAH DIGITAL SCREEN BILATERAL; Future  2.

## 2024-08-30 NOTE — PROGRESS NOTES
.  Chief Complaint   Patient presents with    Follow-up Chronic Condition    Hypertension    Diabetes    Discuss Medications     Would like Rx for Ozempic         .  \"Have you been to the ER, urgent care clinic since your last visit?  Hospitalized since your last visit?\"    NO    “Have you seen or consulted any other health care providers outside of [facility] since your last visit?”    NO    Have you had a mammogram?”   NO    Date of last Mammogram: 6/9/2021      “Have you had a pap smear?”    NO    Date of last Cervical Cancer screen (HPV or PAP): 11/20/2017       ./72 (Site: Right Upper Arm, Position: Sitting, Cuff Size: Large Adult)   Pulse 56   Temp 98 °F (36.7 °C) (Oral)   Ht 1.626 m (5' 4\")   Wt 125.2 kg (276 lb)   SpO2 98%   BMI 47.38 kg/m²         Click Here for Release of Records Request

## 2024-09-17 ENCOUNTER — OFFICE VISIT (OUTPATIENT)
Age: 54
End: 2024-09-17
Payer: COMMERCIAL

## 2024-09-17 DIAGNOSIS — E11.9 TYPE 2 DIABETES MELLITUS WITHOUT COMPLICATION, WITHOUT LONG-TERM CURRENT USE OF INSULIN (HCC): Primary | ICD-10-CM

## 2024-09-17 PROCEDURE — G0108 DIAB MANAGE TRN  PER INDIV: HCPCS

## 2024-09-27 LAB
ALBUMIN SERPL-MCNC: 4.2 G/DL (ref 3.8–4.9)
ALBUMIN/CREAT UR: <7 MG/G CREAT (ref 0–29)
ALP SERPL-CCNC: 103 IU/L (ref 44–121)
ALT SERPL-CCNC: 22 IU/L (ref 0–32)
APPEARANCE UR: CLEAR
AST SERPL-CCNC: 15 IU/L (ref 0–40)
BASOPHILS # BLD AUTO: 0.1 X10E3/UL (ref 0–0.2)
BASOPHILS NFR BLD AUTO: 1 %
BILIRUB SERPL-MCNC: 0.4 MG/DL (ref 0–1.2)
BILIRUB UR QL STRIP: NEGATIVE
BUN SERPL-MCNC: 18 MG/DL (ref 6–24)
BUN/CREAT SERPL: 25 (ref 9–23)
CALCIUM SERPL-MCNC: 9.5 MG/DL (ref 8.7–10.2)
CHLORIDE SERPL-SCNC: 103 MMOL/L (ref 96–106)
CHOLEST SERPL-MCNC: 108 MG/DL (ref 100–199)
CO2 SERPL-SCNC: 24 MMOL/L (ref 20–29)
COLOR UR: YELLOW
CREAT SERPL-MCNC: 0.72 MG/DL (ref 0.57–1)
CREAT UR-MCNC: 40.9 MG/DL
EGFRCR SERPLBLD CKD-EPI 2021: 99 ML/MIN/1.73
EOSINOPHIL # BLD AUTO: 0.2 X10E3/UL (ref 0–0.4)
EOSINOPHIL NFR BLD AUTO: 2 %
ERYTHROCYTE [DISTWIDTH] IN BLOOD BY AUTOMATED COUNT: 13.4 % (ref 11.7–15.4)
GLOBULIN SER CALC-MCNC: 2.9 G/DL (ref 1.5–4.5)
GLUCOSE SERPL-MCNC: 141 MG/DL (ref 70–99)
GLUCOSE UR QL STRIP: ABNORMAL
HBA1C MFR BLD: 7.7 % (ref 4.8–5.6)
HCT VFR BLD AUTO: 43.3 % (ref 34–46.6)
HDLC SERPL-MCNC: 58 MG/DL
HGB BLD-MCNC: 13 G/DL (ref 11.1–15.9)
HGB UR QL STRIP: NEGATIVE
IMM GRANULOCYTES # BLD AUTO: 0 X10E3/UL (ref 0–0.1)
IMM GRANULOCYTES NFR BLD AUTO: 0 %
KETONES UR QL STRIP: NEGATIVE
LDLC SERPL CALC-MCNC: 33 MG/DL (ref 0–99)
LEUKOCYTE ESTERASE UR QL STRIP: NEGATIVE
LYMPHOCYTES # BLD AUTO: 2.5 X10E3/UL (ref 0.7–3.1)
LYMPHOCYTES NFR BLD AUTO: 27 %
MCH RBC QN AUTO: 25 PG (ref 26.6–33)
MCHC RBC AUTO-ENTMCNC: 30 G/DL (ref 31.5–35.7)
MCV RBC AUTO: 83 FL (ref 79–97)
MICROALBUMIN UR-MCNC: <3 UG/ML
MONOCYTES # BLD AUTO: 0.6 X10E3/UL (ref 0.1–0.9)
MONOCYTES NFR BLD AUTO: 6 %
NEUTROPHILS # BLD AUTO: 5.7 X10E3/UL (ref 1.4–7)
NEUTROPHILS NFR BLD AUTO: 64 %
NITRITE UR QL STRIP: NEGATIVE
PH UR STRIP: 6.5 [PH] (ref 5–7.5)
PLATELET # BLD AUTO: 259 X10E3/UL (ref 150–450)
POTASSIUM SERPL-SCNC: 4.8 MMOL/L (ref 3.5–5.2)
PROT SERPL-MCNC: 7.1 G/DL (ref 6–8.5)
PROT UR QL STRIP: NEGATIVE
RBC # BLD AUTO: 5.19 X10E6/UL (ref 3.77–5.28)
SODIUM SERPL-SCNC: 142 MMOL/L (ref 134–144)
SP GR UR STRIP: >=1.03 (ref 1–1.03)
TRIGL SERPL-MCNC: 85 MG/DL (ref 0–149)
TSH SERPL DL<=0.005 MIU/L-ACNC: 2 UIU/ML (ref 0.45–4.5)
UROBILINOGEN UR STRIP-MCNC: 0.2 MG/DL (ref 0.2–1)
VLDLC SERPL CALC-MCNC: 17 MG/DL (ref 5–40)
WBC # BLD AUTO: 9.1 X10E3/UL (ref 3.4–10.8)

## 2024-10-11 ENCOUNTER — OFFICE VISIT (OUTPATIENT)
Facility: CLINIC | Age: 54
End: 2024-10-11
Payer: COMMERCIAL

## 2024-10-11 VITALS
SYSTOLIC BLOOD PRESSURE: 122 MMHG | OXYGEN SATURATION: 99 % | WEIGHT: 270 LBS | DIASTOLIC BLOOD PRESSURE: 79 MMHG | TEMPERATURE: 97.8 F | HEIGHT: 64 IN | BODY MASS INDEX: 46.1 KG/M2 | HEART RATE: 52 BPM

## 2024-10-11 DIAGNOSIS — E78.00 HYPERCHOLESTEROLEMIA: ICD-10-CM

## 2024-10-11 DIAGNOSIS — I10 ESSENTIAL HYPERTENSION, BENIGN: ICD-10-CM

## 2024-10-11 DIAGNOSIS — E11.9 TYPE 2 DIABETES MELLITUS WITHOUT COMPLICATION, WITHOUT LONG-TERM CURRENT USE OF INSULIN (HCC): ICD-10-CM

## 2024-10-11 DIAGNOSIS — E11.9 TYPE 2 DIABETES MELLITUS WITHOUT COMPLICATION, WITHOUT LONG-TERM CURRENT USE OF INSULIN (HCC): Primary | ICD-10-CM

## 2024-10-11 PROCEDURE — 3078F DIAST BP <80 MM HG: CPT | Performed by: INTERNAL MEDICINE

## 2024-10-11 PROCEDURE — 3074F SYST BP LT 130 MM HG: CPT | Performed by: INTERNAL MEDICINE

## 2024-10-11 PROCEDURE — 99214 OFFICE O/P EST MOD 30 MIN: CPT | Performed by: INTERNAL MEDICINE

## 2024-10-11 PROCEDURE — 3051F HG A1C>EQUAL 7.0%<8.0%: CPT | Performed by: INTERNAL MEDICINE

## 2024-10-11 RX ORDER — FELODIPINE 5 MG/1
5 TABLET, EXTENDED RELEASE ORAL DAILY
Qty: 90 TABLET | Refills: 1 | Status: SHIPPED | OUTPATIENT
Start: 2024-10-11

## 2024-10-11 RX ORDER — LOSARTAN POTASSIUM 50 MG/1
50 TABLET ORAL 2 TIMES DAILY
Qty: 180 TABLET | Refills: 1 | Status: SHIPPED | OUTPATIENT
Start: 2024-10-11

## 2024-10-11 RX ORDER — METOPROLOL TARTRATE 50 MG
50 TABLET ORAL 2 TIMES DAILY
Qty: 180 TABLET | Refills: 1 | Status: SHIPPED | OUTPATIENT
Start: 2024-10-11

## 2024-10-11 RX ORDER — FLASH GLUCOSE SENSOR
KIT MISCELLANEOUS
Qty: 1 EACH | Refills: 5 | Status: SHIPPED | OUTPATIENT
Start: 2024-10-11

## 2024-10-11 RX ORDER — SEMAGLUTIDE 0.68 MG/ML
INJECTION, SOLUTION SUBCUTANEOUS
Qty: 9 ML | Refills: 3 | Status: SHIPPED | OUTPATIENT
Start: 2024-10-11

## 2024-10-11 RX ORDER — ATORVASTATIN CALCIUM 80 MG/1
80 TABLET, FILM COATED ORAL DAILY
Qty: 90 TABLET | Refills: 1 | Status: SHIPPED | OUTPATIENT
Start: 2024-10-11

## 2024-10-11 ASSESSMENT — ENCOUNTER SYMPTOMS
NAUSEA: 0
COUGH: 0
DIARRHEA: 0
ABDOMINAL PAIN: 0
VOMITING: 0
SHORTNESS OF BREATH: 0

## 2024-10-11 NOTE — PROGRESS NOTES
.  Chief Complaint   Patient presents with    Follow-up    Discuss Labs     .  \"Have you been to the ER, urgent care clinic since your last visit?  Hospitalized since your last visit?\"    NO    “Have you seen or consulted any other health care providers outside our system since your last visit?”    NO    Have you had a mammogram?”   NO    Date of last Mammogram: 6/9/2021      “Have you had a pap smear?”    NO    Date of last Cervical Cancer screen (HPV or PAP): 11/20/2017     ./79 (Site: Right Upper Arm, Position: Sitting, Cuff Size: Large Adult)   Pulse 52   Temp 97.8 °F (36.6 °C) (Oral)   Ht 1.626 m (5' 4\")   Wt 122.5 kg (270 lb)   SpO2 99%   BMI 46.35 kg/m²            
    No current facility-administered medications for this visit.       Allergies   Allergen Reactions    Penicillins Hives and Rash     Reaction Type: Allergy          Past Medical History:   Diagnosis Date    Abnormal LFTs     Acute ischemic left MCA stroke (HCC)     Acute type 2 diabetes mellitus with manifestations (HCC)     Hypercholesterolemia     Hypertension     Leukocytosis     Obesity (BMI 30-39.9)     Pneumonia of right upper lobe due to infectious organism     Stroke (HCC)         Family History   Problem Relation Age of Onset    Stroke Mother     Cancer Mother 33        uterine    Diabetes Paternal Grandmother     Breast Cancer Sister     Cancer Father         skin    Hypertension Father         Past Surgical History:   Procedure Laterality Date     SECTION   &      SECTION      X 2       Social History     Tobacco Use    Smoking status: Former     Types: Cigarettes    Smokeless tobacco: Never   Vaping Use    Vaping status: Never Used   Substance Use Topics    Alcohol use: Not Currently     Comment: socially    Drug use: Never         Review of Systems   Constitutional:  Negative for appetite change and fatigue.   Eyes:  Negative for visual disturbance.   Respiratory:  Negative for cough and shortness of breath.    Cardiovascular:  Negative for chest pain and leg swelling.   Gastrointestinal:  Negative for abdominal pain, diarrhea, nausea and vomiting.   Genitourinary:  Negative for dysuria.   Skin:  Negative for rash.   Neurological:  Negative for tremors and headaches.   Psychiatric/Behavioral:  The patient is not nervous/anxious.        /79 (Site: Right Upper Arm, Position: Sitting, Cuff Size: Large Adult)   Pulse 52   Temp 97.8 °F (36.6 °C) (Oral)   Ht 1.626 m (5' 4\")   Wt 122.5 kg (270 lb)   SpO2 99%   BMI 46.35 kg/m²      Physical Exam  Constitutional:       No acute distress noted  HENT:      Head: Normocephalic and atraumatic.      Right Ear: External ear

## 2024-12-21 DIAGNOSIS — E11.9 TYPE 2 DIABETES MELLITUS WITHOUT COMPLICATION, WITHOUT LONG-TERM CURRENT USE OF INSULIN (HCC): ICD-10-CM

## 2024-12-21 RX ORDER — ZOSTER VACCINE RECOMBINANT, ADJUVANTED 50 MCG/0.5
0.5 KIT INTRAMUSCULAR SEE ADMIN INSTRUCTIONS
Qty: 0.5 ML | Refills: 0 | Status: SHIPPED | OUTPATIENT
Start: 2024-12-21 | End: 2025-06-19

## 2024-12-23 RX ORDER — FLUVOXAMINE MALEATE 50 MG
50 TABLET ORAL NIGHTLY
Qty: 30 TABLET | Refills: 3 | Status: SHIPPED | OUTPATIENT
Start: 2024-12-23

## 2025-01-05 RX ORDER — LINACLOTIDE 145 UG/1
145 CAPSULE, GELATIN COATED ORAL DAILY
Qty: 90 CAPSULE | Refills: 1 | Status: SHIPPED | OUTPATIENT
Start: 2025-01-05

## 2025-01-17 DIAGNOSIS — E11.9 TYPE 2 DIABETES MELLITUS WITHOUT COMPLICATION, WITHOUT LONG-TERM CURRENT USE OF INSULIN (HCC): ICD-10-CM

## 2025-01-18 RX ORDER — BLOOD SUGAR DIAGNOSTIC
STRIP MISCELLANEOUS
Qty: 100 STRIP | Refills: 2 | Status: SHIPPED | OUTPATIENT
Start: 2025-01-18

## 2025-01-30 SDOH — ECONOMIC STABILITY: TRANSPORTATION INSECURITY
IN THE PAST 12 MONTHS, HAS THE LACK OF TRANSPORTATION KEPT YOU FROM MEDICAL APPOINTMENTS OR FROM GETTING MEDICATIONS?: NO

## 2025-01-30 SDOH — ECONOMIC STABILITY: FOOD INSECURITY: WITHIN THE PAST 12 MONTHS, THE FOOD YOU BOUGHT JUST DIDN'T LAST AND YOU DIDN'T HAVE MONEY TO GET MORE.: NEVER TRUE

## 2025-01-30 SDOH — ECONOMIC STABILITY: FOOD INSECURITY: WITHIN THE PAST 12 MONTHS, YOU WORRIED THAT YOUR FOOD WOULD RUN OUT BEFORE YOU GOT MONEY TO BUY MORE.: NEVER TRUE

## 2025-01-30 ASSESSMENT — PATIENT HEALTH QUESTIONNAIRE - PHQ9
SUM OF ALL RESPONSES TO PHQ QUESTIONS 1-9: 2
1. LITTLE INTEREST OR PLEASURE IN DOING THINGS: SEVERAL DAYS
SUM OF ALL RESPONSES TO PHQ9 QUESTIONS 1 & 2: 2
SUM OF ALL RESPONSES TO PHQ QUESTIONS 1-9: 2
2. FEELING DOWN, DEPRESSED OR HOPELESS: SEVERAL DAYS
SUM OF ALL RESPONSES TO PHQ QUESTIONS 1-9: 2
SUM OF ALL RESPONSES TO PHQ QUESTIONS 1-9: 2

## 2025-01-31 LAB
ALBUMIN SERPL-MCNC: 4.2 G/DL (ref 3.8–4.9)
ALBUMIN/CREAT UR: <6 MG/G CREAT (ref 0–29)
ALP SERPL-CCNC: 116 IU/L (ref 44–121)
ALT SERPL-CCNC: 24 IU/L (ref 0–32)
AST SERPL-CCNC: 18 IU/L (ref 0–40)
BASOPHILS # BLD AUTO: 0.1 X10E3/UL (ref 0–0.2)
BASOPHILS NFR BLD AUTO: 1 %
BILIRUB SERPL-MCNC: 0.3 MG/DL (ref 0–1.2)
BUN SERPL-MCNC: 25 MG/DL (ref 6–24)
BUN/CREAT SERPL: 33 (ref 9–23)
CALCIUM SERPL-MCNC: 9.5 MG/DL (ref 8.7–10.2)
CHLORIDE SERPL-SCNC: 103 MMOL/L (ref 96–106)
CHOLEST SERPL-MCNC: 121 MG/DL (ref 100–199)
CO2 SERPL-SCNC: 25 MMOL/L (ref 20–29)
CREAT SERPL-MCNC: 0.76 MG/DL (ref 0.57–1)
CREAT UR-MCNC: 52.1 MG/DL
EGFRCR SERPLBLD CKD-EPI 2021: 93 ML/MIN/1.73
EOSINOPHIL # BLD AUTO: 0.3 X10E3/UL (ref 0–0.4)
EOSINOPHIL NFR BLD AUTO: 3 %
ERYTHROCYTE [DISTWIDTH] IN BLOOD BY AUTOMATED COUNT: 14.3 % (ref 11.7–15.4)
GLOBULIN SER CALC-MCNC: 2.8 G/DL (ref 1.5–4.5)
GLUCOSE SERPL-MCNC: 102 MG/DL (ref 70–99)
HBA1C MFR BLD: 6.6 % (ref 4.8–5.6)
HCT VFR BLD AUTO: 43.2 % (ref 34–46.6)
HDLC SERPL-MCNC: 56 MG/DL
HGB BLD-MCNC: 13.5 G/DL (ref 11.1–15.9)
IMM GRANULOCYTES # BLD AUTO: 0 X10E3/UL (ref 0–0.1)
IMM GRANULOCYTES NFR BLD AUTO: 0 %
LDLC SERPL CALC-MCNC: 44 MG/DL (ref 0–99)
LYMPHOCYTES # BLD AUTO: 3.3 X10E3/UL (ref 0.7–3.1)
LYMPHOCYTES NFR BLD AUTO: 30 %
MCH RBC QN AUTO: 25.6 PG (ref 26.6–33)
MCHC RBC AUTO-ENTMCNC: 31.3 G/DL (ref 31.5–35.7)
MCV RBC AUTO: 82 FL (ref 79–97)
MICROALBUMIN UR-MCNC: <3 UG/ML
MONOCYTES # BLD AUTO: 0.6 X10E3/UL (ref 0.1–0.9)
MONOCYTES NFR BLD AUTO: 6 %
NEUTROPHILS # BLD AUTO: 6.7 X10E3/UL (ref 1.4–7)
NEUTROPHILS NFR BLD AUTO: 60 %
PLATELET # BLD AUTO: 294 X10E3/UL (ref 150–450)
POTASSIUM SERPL-SCNC: 4.6 MMOL/L (ref 3.5–5.2)
PROT SERPL-MCNC: 7 G/DL (ref 6–8.5)
RBC # BLD AUTO: 5.28 X10E6/UL (ref 3.77–5.28)
SODIUM SERPL-SCNC: 142 MMOL/L (ref 134–144)
TRIGL SERPL-MCNC: 117 MG/DL (ref 0–149)
VLDLC SERPL CALC-MCNC: 21 MG/DL (ref 5–40)
WBC # BLD AUTO: 11 X10E3/UL (ref 3.4–10.8)

## 2025-01-31 ASSESSMENT — PATIENT HEALTH QUESTIONNAIRE - PHQ9
2. FEELING DOWN, DEPRESSED OR HOPELESS: SEVERAL DAYS
1. LITTLE INTEREST OR PLEASURE IN DOING THINGS: SEVERAL DAYS
SUM OF ALL RESPONSES TO PHQ9 QUESTIONS 1 & 2: 2

## 2025-02-18 ENCOUNTER — OFFICE VISIT (OUTPATIENT)
Facility: CLINIC | Age: 55
End: 2025-02-18
Payer: COMMERCIAL

## 2025-02-18 VITALS
OXYGEN SATURATION: 97 % | DIASTOLIC BLOOD PRESSURE: 78 MMHG | SYSTOLIC BLOOD PRESSURE: 128 MMHG | HEIGHT: 64 IN | WEIGHT: 279 LBS | BODY MASS INDEX: 47.63 KG/M2 | HEART RATE: 58 BPM

## 2025-02-18 DIAGNOSIS — E78.00 HYPERCHOLESTEROLEMIA: ICD-10-CM

## 2025-02-18 DIAGNOSIS — D72.829 LEUKOCYTOSIS, UNSPECIFIED TYPE: ICD-10-CM

## 2025-02-18 DIAGNOSIS — E11.9 TYPE 2 DIABETES MELLITUS WITHOUT COMPLICATION, WITHOUT LONG-TERM CURRENT USE OF INSULIN (HCC): ICD-10-CM

## 2025-02-18 DIAGNOSIS — I10 ESSENTIAL HYPERTENSION, BENIGN: ICD-10-CM

## 2025-02-18 DIAGNOSIS — E66.01 MORBID (SEVERE) OBESITY DUE TO EXCESS CALORIES: ICD-10-CM

## 2025-02-18 DIAGNOSIS — I10 ESSENTIAL HYPERTENSION, BENIGN: Primary | ICD-10-CM

## 2025-02-18 PROBLEM — E11.42 TYPE 2 DIABETES MELLITUS WITH DIABETIC POLYNEUROPATHY, WITHOUT LONG-TERM CURRENT USE OF INSULIN (HCC): Status: ACTIVE | Noted: 2025-02-18

## 2025-02-18 PROBLEM — E11.42 TYPE 2 DIABETES MELLITUS WITH DIABETIC POLYNEUROPATHY, WITHOUT LONG-TERM CURRENT USE OF INSULIN (HCC): Status: RESOLVED | Noted: 2025-02-18 | Resolved: 2025-02-18

## 2025-02-18 PROCEDURE — 99214 OFFICE O/P EST MOD 30 MIN: CPT | Performed by: INTERNAL MEDICINE

## 2025-02-18 PROCEDURE — 3078F DIAST BP <80 MM HG: CPT | Performed by: INTERNAL MEDICINE

## 2025-02-18 PROCEDURE — 3044F HG A1C LEVEL LT 7.0%: CPT | Performed by: INTERNAL MEDICINE

## 2025-02-18 PROCEDURE — 3074F SYST BP LT 130 MM HG: CPT | Performed by: INTERNAL MEDICINE

## 2025-02-18 RX ORDER — ATORVASTATIN CALCIUM 80 MG/1
80 TABLET, FILM COATED ORAL DAILY
Qty: 90 TABLET | Refills: 1 | Status: SHIPPED | OUTPATIENT
Start: 2025-02-18

## 2025-02-18 RX ORDER — METOPROLOL TARTRATE 50 MG
50 TABLET ORAL 2 TIMES DAILY
Qty: 180 TABLET | Refills: 1 | Status: SHIPPED | OUTPATIENT
Start: 2025-02-18

## 2025-02-18 RX ORDER — FELODIPINE 5 MG/1
5 TABLET, EXTENDED RELEASE ORAL DAILY
Qty: 90 TABLET | Refills: 1 | Status: SHIPPED | OUTPATIENT
Start: 2025-02-18

## 2025-02-18 RX ORDER — LOSARTAN POTASSIUM 50 MG/1
50 TABLET ORAL 2 TIMES DAILY
Qty: 180 TABLET | Refills: 1 | Status: SHIPPED | OUTPATIENT
Start: 2025-02-18

## 2025-02-18 ASSESSMENT — ENCOUNTER SYMPTOMS
COUGH: 0
SHORTNESS OF BREATH: 0
VOMITING: 0
NAUSEA: 0
DIARRHEA: 0
ABDOMINAL PAIN: 0

## 2025-02-18 NOTE — PROGRESS NOTES
.  Chief Complaint   Patient presents with    Follow-up    Discuss Labs   .  \"Have you been to the ER, urgent care clinic since your last visit?  Hospitalized since your last visit?\"    NO    “Have you seen or consulted any other health care providers outside our system since your last visit?”    NO    Have you had a mammogram?”   NO scheduled for 3/19/25    Date of last Mammogram: 6/9/2021      “Have you had a pap smear?”    NO    Date of last Cervical Cancer screen (HPV or PAP): 11/20/2017     ./78 (Site: Right Upper Arm, Position: Sitting, Cuff Size: Medium Adult)   Pulse 58   Ht 1.626 m (5' 4\")   Wt 126.6 kg (279 lb)   SpO2 97%   BMI 47.89 kg/m²

## 2025-02-18 NOTE — PROGRESS NOTES
[facility]  [address], [place] [address]  Phone: [phone]      Poornima Ulloa (: 1970) is a 54 y.o. female, established patient, here for evaluation of the following chief complaint(s):  Follow-up and Discuss Labs       SUBJECTIVE/OBJECTIVE:  History of Present Illness  The patient is a 54-year-old female with a past medical history of hypertension, type 2 diabetes, hypercholesterolemia, obesity, history of left middle cerebral artery stroke, and abnormal liver function tests (LFTs), seen today for follow-up of blood pressure, review of her blood test done on 2025, and for refill of medications.    She reports overall good health, with her blood glucose levels typically below 120, as monitored daily using a sensor. She has observed that her blood glucose levels fluctuate based on the quality of her sleep, with a recent reading of 145 after a night of poor sleep. The lowest recorded level in the past two weeks was between 100 and 110. She does not experience hypoglycemia. She has one more refill of metformin and Linzess left and needs refills for Jardiance. Her current medication regimen includes metformin 1 tablet twice daily, Jardiance 25 mg, and Ozempic 0.5 mg.    She has been off felodipine for the past two days due to difficulties in obtaining it from [facility] but plans to visit the pharmacy today to refill her prescription. She has no refills left for losartan and metoprolol. She is on losartan 50 mg twice daily, metoprolol 50 mg twice daily, and felodipine 5 mg.    She has gained a few pounds since her last visit. She spends most of her time driving and does not engage in regular exercise. She does not consume meat or soda.    She has an upcoming appointment with her ophthalmologist, Dr. Steven, in 2025. She has an appointment for a mammogram on 2025. She does not experience any breast pain or nipple discharge. She also

## 2025-02-21 DIAGNOSIS — E11.9 TYPE 2 DIABETES MELLITUS WITHOUT COMPLICATION, WITHOUT LONG-TERM CURRENT USE OF INSULIN (HCC): ICD-10-CM

## 2025-03-12 ENCOUNTER — OFFICE VISIT (OUTPATIENT)
Age: 55
End: 2025-03-12
Payer: COMMERCIAL

## 2025-03-12 VITALS
OXYGEN SATURATION: 99 % | RESPIRATION RATE: 18 BRPM | TEMPERATURE: 97.7 F | DIASTOLIC BLOOD PRESSURE: 76 MMHG | HEART RATE: 76 BPM | SYSTOLIC BLOOD PRESSURE: 130 MMHG

## 2025-03-12 DIAGNOSIS — I63.9: Primary | ICD-10-CM

## 2025-03-12 PROCEDURE — 3075F SYST BP GE 130 - 139MM HG: CPT | Performed by: NURSE PRACTITIONER

## 2025-03-12 PROCEDURE — 99214 OFFICE O/P EST MOD 30 MIN: CPT | Performed by: NURSE PRACTITIONER

## 2025-03-12 PROCEDURE — 3078F DIAST BP <80 MM HG: CPT | Performed by: NURSE PRACTITIONER

## 2025-03-12 RX ORDER — FLUVOXAMINE MALEATE 50 MG
50 TABLET ORAL NIGHTLY
Qty: 90 TABLET | Refills: 2 | Status: SHIPPED | OUTPATIENT
Start: 2025-03-12

## 2025-03-12 NOTE — PROGRESS NOTES
Poornima Ulloa is a 54 y.o. female who presents with the following  Chief Complaint   Patient presents with    Cerebrovascular Accident       HPI        Patient comes back for her CVA in 2021    She continues to aggressively maintain lifestyle changes  She is aware of stroke symptoms  She is on blood pressure medications  She is on Ozempic and working at losing weight and dropping her blood sugar down  She is also on 80 mg of Lipitor  Her LDL is stable    She is getting blood work redone before her next primary care appointment  She is still having some right handed pain and tightness  She feels like the hand gets stuck at times  She is not sure if it is arthritis versus carpal tunnel  Not concerning enough yet to investigate    She does drive a lot for work and is a  for the [employer]    Continue to take her Luvox 50 mg  Anxiety and depression is stable    Allergies   Allergen Reactions    Penicillins Hives and Rash     Reaction Type: Allergy         Current Outpatient Medications   Medication Sig Dispense Refill    fluvoxaMINE (LUVOX) 50 MG tablet Take 1 tablet by mouth nightly 90 tablet 2    empagliflozin (JARDIANCE) 25 MG tablet Take 1 tablet by mouth daily 90 tablet 1    losartan (COZAAR) 50 MG tablet Take 1 tablet by mouth 2 times daily 180 tablet 1    metoprolol tartrate (LOPRESSOR) 50 MG tablet Take 1 tablet by mouth 2 times daily 180 tablet 1    felodipine (PLENDIL) 5 MG extended release tablet Take 1 tablet by mouth daily 90 tablet 1    Semaglutide, 1 MG/DOSE, (OZEMPIC) 4 MG/3ML SOPN sc injection Inject 1 mg into the skin every 7 days 3 mL 3    linaclotide (LINZESS) 145 MCG capsule Take 1 capsule by mouth daily 90 capsule 1    atorvastatin (LIPITOR) 80 MG tablet Take 1 tablet by mouth daily 90 tablet 1    blood glucose test strips (ONETOUCH ULTRA) strip USE TO TEST BLOOD SUGAR DAILY AS NEEDED 100 strip 2    zoster recombinant adjuvanted vaccine (SHINGRIX) 50 MCG/0.5ML SUSR injection

## 2025-06-26 ENCOUNTER — OFFICE VISIT (OUTPATIENT)
Facility: CLINIC | Age: 55
End: 2025-06-26
Payer: COMMERCIAL

## 2025-06-26 VITALS
OXYGEN SATURATION: 100 % | SYSTOLIC BLOOD PRESSURE: 133 MMHG | DIASTOLIC BLOOD PRESSURE: 84 MMHG | HEART RATE: 73 BPM | BODY MASS INDEX: 47.12 KG/M2 | WEIGHT: 276 LBS | HEIGHT: 64 IN

## 2025-06-26 DIAGNOSIS — I10 ESSENTIAL HYPERTENSION, BENIGN: ICD-10-CM

## 2025-06-26 DIAGNOSIS — E66.01 MORBID (SEVERE) OBESITY DUE TO EXCESS CALORIES (HCC): ICD-10-CM

## 2025-06-26 DIAGNOSIS — Z00.01 ENCOUNTER FOR ANNUAL GENERAL MEDICAL EXAMINATION WITH ABNORMAL FINDINGS IN ADULT: ICD-10-CM

## 2025-06-26 DIAGNOSIS — E11.9 TYPE 2 DIABETES MELLITUS WITHOUT COMPLICATION, WITHOUT LONG-TERM CURRENT USE OF INSULIN (HCC): ICD-10-CM

## 2025-06-26 DIAGNOSIS — E78.00 HYPERCHOLESTEROLEMIA: ICD-10-CM

## 2025-06-26 DIAGNOSIS — Z00.01 ENCOUNTER FOR ANNUAL GENERAL MEDICAL EXAMINATION WITH ABNORMAL FINDINGS IN ADULT: Primary | ICD-10-CM

## 2025-06-26 PROBLEM — Z86.73 HISTORY OF STROKE: Status: ACTIVE | Noted: 2025-06-26

## 2025-06-26 PROCEDURE — 3079F DIAST BP 80-89 MM HG: CPT | Performed by: INTERNAL MEDICINE

## 2025-06-26 PROCEDURE — 3075F SYST BP GE 130 - 139MM HG: CPT | Performed by: INTERNAL MEDICINE

## 2025-06-26 PROCEDURE — 99396 PREV VISIT EST AGE 40-64: CPT | Performed by: INTERNAL MEDICINE

## 2025-06-26 RX ORDER — FUROSEMIDE 20 MG/1
TABLET ORAL
Qty: 90 TABLET | Refills: 0 | Status: SHIPPED | OUTPATIENT
Start: 2025-06-26

## 2025-06-26 RX ORDER — FLASH GLUCOSE SENSOR
KIT MISCELLANEOUS
Qty: 1 EACH | Refills: 5 | Status: SHIPPED | OUTPATIENT
Start: 2025-06-26

## 2025-06-26 ASSESSMENT — ENCOUNTER SYMPTOMS
SHORTNESS OF BREATH: 0
COUGH: 0
VOMITING: 0
NAUSEA: 0
ABDOMINAL PAIN: 0
DIARRHEA: 0

## 2025-06-26 NOTE — PROGRESS NOTES
.  Chief Complaint   Patient presents with    Annual Exam     .  Have you been to the ER, urgent care clinic since your last visit?  Hospitalized since your last visit?   NO    Have you seen or consulted any other health care providers outside our system since your last visit?   NO    Have you had a mammogram?”   YES - Where: [facility] to request most recent records if not in the chart    Date of last Mammogram: 6/9/2021      “Have you had a pap smear?”    NO    Date of last Cervical Cancer screen (HPV or PAP): 11/20/2017       “Have you had a diabetic eye exam?”    YES - Where: [facility] Nurse/[facility] to request most recent records if not in the chart     Date of last diabetic eye exam: 1/20/2023     ./84 (BP Site: Right Upper Arm, Patient Position: Sitting, BP Cuff Size: Large Adult)   Pulse 73   Ht 1.626 m (5' 4\")   Wt 125.2 kg (276 lb)   SpO2 100%   BMI 47.38 kg/m²

## 2025-06-26 NOTE — PROGRESS NOTES
[facility]  [address], [place] [address]  Phone: [phone]      Poornima Ulloa (: 1970) is a 54 y.o. female, established patient, here for evaluation of the following chief complaint(s):  Annual Exam         SUBJECTIVE/OBJECTIVE:  History of Present Illness  54-year-old female with past medical history of hypertension 2 diabetes, hypercholesterolemia, obesity is seen today for annual physical and follow-up of her blood pressure and diabetes.    Patient stated she is doing good overall.  Is taking her medications Jardiance vomiting, losartan, metoprolol, felodipine daily.  She stated, atorvastatin she is taking for 6 days a week.  Is taking Lasix 20 mg daily as needed for ankle swelling and that she needs a refill for Lasix and semaglutide.    She states that she gets ankle swelling at times.  Patient informed she has been eating healthy diet but still cannot lose any weight.  She informed she did not have any blood test recently as she was busy at work.    Had 2 doses of shingles vaccine.  Has appointment with Dr. Steven in  for eye examination..    Had mammogram in 2025 and was normal.        Results       Hemoglobin A1C   Date Value Ref Range Status   2025 6.6 (H) 4.8 - 5.6 % Final     Comment:                 Prediabetes: 5.7 - 6.4           Diabetes: >6.4           Glycemic control for adults with diabetes: <7.0        Hemoglobin   Date Value Ref Range Status   2025 13.5 11.1 - 15.9 g/dL Final     Hematocrit   Date Value Ref Range Status   2025 43.2 34.0 - 46.6 % Final     Creatinine   Date Value Ref Range Status   2025 0.76 0.57 - 1.00 mg/dL Final     Glucose   Date Value Ref Range Status   2025 102 (H) 70 - 99 mg/dL Final     TSH   Date Value Ref Range Status   2024 2.000 0.450 - 4.500 uIU/mL Final     Potassium   Date Value Ref Range Status   2025 4.6 3.5 - 5.2 mmol/L Final

## 2025-07-11 DIAGNOSIS — I10 ESSENTIAL HYPERTENSION, BENIGN: ICD-10-CM

## 2025-07-12 RX ORDER — METOPROLOL TARTRATE 50 MG
50 TABLET ORAL 2 TIMES DAILY
Qty: 180 TABLET | Refills: 1 | Status: SHIPPED | OUTPATIENT
Start: 2025-07-12

## 2025-07-12 RX ORDER — FELODIPINE 5 MG/1
5 TABLET, EXTENDED RELEASE ORAL DAILY
Qty: 90 TABLET | Refills: 1 | Status: SHIPPED | OUTPATIENT
Start: 2025-07-12

## 2025-07-29 DIAGNOSIS — I10 ESSENTIAL HYPERTENSION, BENIGN: ICD-10-CM

## 2025-07-30 PROBLEM — I63.9: Status: RESOLVED | Noted: 2022-11-30 | Resolved: 2025-07-30

## 2025-07-30 RX ORDER — LOSARTAN POTASSIUM 50 MG/1
50 TABLET ORAL 2 TIMES DAILY
Qty: 180 TABLET | Refills: 1 | Status: SHIPPED | OUTPATIENT
Start: 2025-07-30

## 2025-07-30 RX ORDER — LINACLOTIDE 145 UG/1
145 CAPSULE, GELATIN COATED ORAL DAILY
Qty: 90 CAPSULE | Refills: 1 | Status: SHIPPED | OUTPATIENT
Start: 2025-07-30

## 2025-08-07 DIAGNOSIS — E78.00 HYPERCHOLESTEROLEMIA: ICD-10-CM

## 2025-08-09 RX ORDER — ATORVASTATIN CALCIUM 80 MG/1
80 TABLET, FILM COATED ORAL DAILY
Qty: 90 TABLET | Refills: 1 | Status: SHIPPED | OUTPATIENT
Start: 2025-08-09